# Patient Record
Sex: FEMALE | Race: WHITE | NOT HISPANIC OR LATINO | Employment: OTHER | URBAN - METROPOLITAN AREA
[De-identification: names, ages, dates, MRNs, and addresses within clinical notes are randomized per-mention and may not be internally consistent; named-entity substitution may affect disease eponyms.]

---

## 2017-07-10 ENCOUNTER — PREPPED CHART (OUTPATIENT)
Dept: URBAN - METROPOLITAN AREA CLINIC 35 | Facility: CLINIC | Age: 42
End: 2017-07-10

## 2019-09-13 ENCOUNTER — CONTACT LENS EXAM NEW (OUTPATIENT)
Dept: URBAN - METROPOLITAN AREA CLINIC 35 | Facility: CLINIC | Age: 44
End: 2019-09-13

## 2019-09-13 DIAGNOSIS — H52.203: ICD-10-CM

## 2019-09-13 DIAGNOSIS — H52.13: ICD-10-CM

## 2019-09-13 PROCEDURE — 92310-2 LEVEL 2 CONTACT LENS MANAGEMENT

## 2019-09-13 PROCEDURE — 92015 DETERMINE REFRACTIVE STATE: CPT

## 2019-09-13 PROCEDURE — 92014 COMPRE OPH EXAM EST PT 1/>: CPT

## 2019-09-13 ASSESSMENT — VISUAL ACUITY
OD_CC: J1
OD_CC: 20/20
OD_CC: 20/20-1 CL
OD_CC: J1 CL
OS_CC: 20/20
OS_CC: J1
OD_SC: J12
OS_CC: 20/20-1 CL
OS_CC: J1 CL
OS_SC: J12

## 2019-09-13 ASSESSMENT — KERATOMETRY
OS_K2POWER_DIOPTERS: 42.25
OS_K1POWER_DIOPTERS: 41.5
OD_K2POWER_DIOPTERS: 42.25
OD_AXISANGLE2_DEGREES: 84
OS_AXISANGLE2_DEGREES: 98
OD_AXISANGLE_DEGREES: 174
OD_K1POWER_DIOPTERS: 41.25
OS_AXISANGLE_DEGREES: 8

## 2019-09-13 ASSESSMENT — TONOMETRY
OD_IOP_MMHG: 14
OD_IOP_MMHG: 10
OS_IOP_MMHG: 14
OS_IOP_MMHG: 11

## 2021-04-30 ENCOUNTER — CONTACT LENS EXAM ESTABLISHED (OUTPATIENT)
Dept: URBAN - METROPOLITAN AREA CLINIC 35 | Facility: CLINIC | Age: 46
End: 2021-04-30

## 2021-04-30 DIAGNOSIS — H52.13: ICD-10-CM

## 2021-04-30 DIAGNOSIS — H52.203: ICD-10-CM

## 2021-04-30 PROCEDURE — 92310-1 LEVEL 1 CONTACT LENS MANAGEMENT

## 2021-04-30 PROCEDURE — 92015 DETERMINE REFRACTIVE STATE: CPT

## 2021-04-30 PROCEDURE — 92014 COMPRE OPH EXAM EST PT 1/>: CPT

## 2021-04-30 ASSESSMENT — KERATOMETRY
OD_AXISANGLE2_DEGREES: 84
OS_AXISANGLE2_DEGREES: 98
OD_K1POWER_DIOPTERS: 41.25
OD_AXISANGLE_DEGREES: 174
OD_K2POWER_DIOPTERS: 42.25
OS_K2POWER_DIOPTERS: 42.25
OS_K1POWER_DIOPTERS: 41.5
OS_AXISANGLE_DEGREES: 8

## 2021-04-30 ASSESSMENT — TONOMETRY
OS_IOP_MMHG: 13
OD_IOP_MMHG: 14

## 2021-04-30 ASSESSMENT — VISUAL ACUITY
OD_CC: J1 CLS
OS_CC: J1 CLS

## 2021-05-07 NOTE — PATIENT DISCUSSION
SAME DAY S/P PC IOL, OS: DOING WELL. CONTINUE POST-OPERATIVE DROPS AS DIRECTED. RETURN FOR FOLLOW-UP 05/10/21 AS SCHEDULED WITH DR. TURNER.

## 2021-05-07 NOTE — PATIENT DISCUSSION
Post-Op Instructions: The patient was instructed in the proper use of post-operative eye drops: pred-gati-brom in the surgical eye 3 times per day as directed. Call back instructions, retinal detachment and endophthalmitis precautions given.

## 2021-05-07 NOTE — PATIENT DISCUSSION
Continue: prednisol ace-gatiflox-bromfen (prednisol ace-gatiflox-bromfen): drops,suspension: 1-0.5-0.075%

## 2024-04-25 ASSESSMENT — KERATOMETRY
OS_AXISANGLE_DEGREES: 8
OS_AXISANGLE2_DEGREES: 98
OD_K2POWER_DIOPTERS: 42.25
OD_K1POWER_DIOPTERS: 41.25
OS_K2POWER_DIOPTERS: 42.25
OD_AXISANGLE2_DEGREES: 84
OS_K1POWER_DIOPTERS: 41.5
OD_AXISANGLE_DEGREES: 174

## 2024-04-26 ENCOUNTER — COMPREHENSIVE EXAM (OUTPATIENT)
Dept: URBAN - METROPOLITAN AREA CLINIC 35 | Facility: CLINIC | Age: 49
End: 2024-04-26

## 2024-04-26 DIAGNOSIS — H52.4: ICD-10-CM

## 2024-04-26 DIAGNOSIS — H52.203: ICD-10-CM

## 2024-04-26 DIAGNOSIS — H52.13: ICD-10-CM

## 2024-04-26 DIAGNOSIS — Z97.3: ICD-10-CM

## 2024-04-26 PROCEDURE — 92014 COMPRE OPH EXAM EST PT 1/>: CPT

## 2024-04-26 PROCEDURE — 92015 DETERMINE REFRACTIVE STATE: CPT

## 2024-04-26 PROCEDURE — 92310-3 LEVEL 3 CONTACT LENS MANAGEMENT

## 2024-04-26 ASSESSMENT — VISUAL ACUITY
OD_CC: 20/20 CL
OD_CC: J4 CL
OU_CC: 20/20 CL
OS_CC: J3 CL
OS_CC: 20/25 CL

## 2024-04-26 ASSESSMENT — TONOMETRY
OD_IOP_MMHG: 14
OS_IOP_MMHG: 13